# Patient Record
Sex: MALE | Race: BLACK OR AFRICAN AMERICAN
[De-identification: names, ages, dates, MRNs, and addresses within clinical notes are randomized per-mention and may not be internally consistent; named-entity substitution may affect disease eponyms.]

---

## 2018-02-11 ENCOUNTER — HOSPITAL ENCOUNTER (EMERGENCY)
Dept: HOSPITAL 17 - NEPD | Age: 18
Discharge: HOME | End: 2018-02-11
Payer: MEDICAID

## 2018-02-11 VITALS
RESPIRATION RATE: 12 BRPM | HEART RATE: 52 BPM | TEMPERATURE: 98.7 F | DIASTOLIC BLOOD PRESSURE: 58 MMHG | SYSTOLIC BLOOD PRESSURE: 125 MMHG | OXYGEN SATURATION: 99 %

## 2018-02-11 VITALS — BODY MASS INDEX: 22.86 KG/M2 | HEIGHT: 69 IN | WEIGHT: 154.32 LBS

## 2018-02-11 DIAGNOSIS — F90.9: ICD-10-CM

## 2018-02-11 DIAGNOSIS — A64: Primary | ICD-10-CM

## 2018-02-11 DIAGNOSIS — F42.9: ICD-10-CM

## 2018-02-11 PROCEDURE — 87591 N.GONORRHOEAE DNA AMP PROB: CPT

## 2018-02-11 PROCEDURE — 87491 CHLMYD TRACH DNA AMP PROBE: CPT

## 2018-02-11 PROCEDURE — 96372 THER/PROPH/DIAG INJ SC/IM: CPT

## 2018-02-11 PROCEDURE — 99283 EMERGENCY DEPT VISIT LOW MDM: CPT

## 2018-02-11 NOTE — PD
HPI


.


Penile discharge


Chief Complaint:   Complaint


Time Seen by Provider:  13:51


Travel History


International Travel<30 days:  No


Contact w/Intl Traveler<30days:  No


Traveled to known affect area:  No





History of Present Illness


HPI


This patient presents with a one-week history of a penile discharge.  It is 

green.  He states he had unprotected sex just prior to the onset of the penile 

discharge.





PFSH


Past Medical History


ADHD:  Yes


Cardiovascular Problems:  No


Developmental Delay:  No


Diminished Hearing:  No


Genitourinary:  No


Musculoskeletal:  No


Neurologic:  No


Psychiatric:  Yes (MOOD /ANGER PROBLEMS/OCD)


Immunizations Current:  Yes


Seizures:  No


Thyroid Disease:  No


Ulcer:  No





Past Surgical History


Other Surgery:  No





Social History


Alcohol Use:  No


Tobacco Use:  No


Substance Use:  No





Allergies-Medications


(Allergen,Severity, Reaction):  


Coded Allergies:  


     No Known Allergies (Unverified  Adverse Reaction, Unknown, 2/11/18)


Reported Meds & Prescriptions





Reported Meds & Active Scripts


Active


Intuniv (Guanfacine Hcl Er (Adhd)) 2 Mg Tab 2 Mg PO Q HS 








Review of Systems


Except as stated in HPI:  all other systems reviewed are Neg


HENT:  Positive: Dental Difficulties, Other (he states that he has impacted 

wisdom teeth that need to be removed)





Physical Exam


Narrative


GENERAL: Awake and alert and in no acute distress.


SKIN: Warm and dry.


HEAD: Normocephalic/atraumatic.


EYES: Pupils are equal.  Extraocular movements are intact.


NECK: Normal range of motion.  Distally


RESPIRATORY: Nonlabored respirations.


MUSCULOSKELETAL: Atraumatic.


NEUROLOGICAL: Nonfocal.


PSYCHIATRIC: Appropriate mood and affect.





Data


Data


Last Documented VS





Vital Signs








  Date Time  Temp Pulse Resp B/P (MAP) Pulse Ox O2 Delivery O2 Flow Rate FiO2


 


2/11/18 13:16 98.7 52 12 125/58 (80) 99   








Orders





 Orders


Gc And Chlamydia Pcr (2/11/18 13:51)


Azithromycin (Zithromax) (2/11/18 14:00)


Ceftriaxone Inj (Rocephin Inj) (2/11/18 14:00)


Lidocaine 1% Inj (50 Ml) (Xylocaine 1% I (2/11/18 14:00)








MDM


Medical Decision Making


Medical Screen Exam Complete:  Yes


Emergency Medical Condition:  Yes


Differential Diagnosis


Differential diagnosis of penile discharge includes but is not limited to 

chlamydia and gonorrhea


Narrative Course


This patient presents complaining with a green penile discharge.  This likely 

gonorrhea.  He will be treated for both gonorrhea and chlamydia with Rocephin 

and Zithromax.





Diagnosis





 Primary Impression:  


 Sexually transmitted disease (STD)


Patient Instructions:  General Instructions, Sexually Transmitted Diseases (ED)


Departure Forms:  Tests/Procedures


Disposition:  01 DISCHARGE HOME


Condition:  Stable











Salome Forbes MD Feb 11, 2018 14:02